# Patient Record
Sex: MALE | Race: WHITE | Employment: OTHER | URBAN - METROPOLITAN AREA
[De-identification: names, ages, dates, MRNs, and addresses within clinical notes are randomized per-mention and may not be internally consistent; named-entity substitution may affect disease eponyms.]

---

## 2017-03-24 ENCOUNTER — HOSPITAL ENCOUNTER (OUTPATIENT)
Dept: GENERAL RADIOLOGY | Age: 82
Discharge: HOME | End: 2017-03-24
Payer: MEDICARE

## 2017-03-24 DIAGNOSIS — M79.671 RIGHT FOOT PAIN: ICD-10-CM

## 2017-03-24 DIAGNOSIS — R73.9 BLOOD GLUCOSE ELEVATED: ICD-10-CM

## 2017-03-24 DIAGNOSIS — R63.4 LOSS OF WEIGHT: ICD-10-CM

## 2017-03-24 PROCEDURE — 73630 X-RAY EXAM OF FOOT: CPT

## 2017-04-17 ENCOUNTER — IMPORTED ENCOUNTER (OUTPATIENT)
Dept: URBAN - METROPOLITAN AREA CLINIC 1 | Facility: CLINIC | Age: 82
End: 2017-04-17

## 2017-04-17 PROBLEM — H04.123: Noted: 2017-04-17

## 2017-04-17 PROBLEM — Z96.1: Noted: 2017-04-17

## 2017-04-17 PROBLEM — H02.831: Noted: 2017-04-17

## 2017-04-17 PROBLEM — H43.813: Noted: 2017-04-17

## 2017-04-17 PROBLEM — H35.033: Noted: 2017-04-17

## 2017-04-17 PROBLEM — H02.834: Noted: 2017-04-17

## 2017-04-17 PROBLEM — H16.143: Noted: 2017-04-17

## 2017-04-17 PROBLEM — H01.005: Noted: 2017-04-17

## 2017-04-17 PROBLEM — H40.013: Noted: 2017-04-17

## 2017-04-17 PROBLEM — H01.002: Noted: 2017-04-17

## 2017-04-17 PROBLEM — H35.3134: Noted: 2017-04-17

## 2017-04-17 PROCEDURE — 92012 INTRM OPH EXAM EST PATIENT: CPT

## 2017-04-17 NOTE — PATIENT DISCUSSION
1.  ARMD OU advanced with subfoveal involvement/dry/stable. Importance of daily AREDS II study multivitamin and Amsler Grid checks discussed with patient. Patient to follow-up immediately with any new onset of decreased vision and/or metamorphopsia. 2. Glaucoma Suspect OU (0.8/0.75): Stabl IOP and cupping OU. Past w/u (-). Patient is considered Low Risk. 3.  SHILA w/ PEK OU- Stable. The increase of artificial tears OU to BID were recommended. 4.  Blepharitis posterior type OU- Continue daily warm compresses and lid scrubs were recommended. 5. Dermatochalasis OU UL's- Follow with no intervention at this time. 6. GR I Hypertensive Retinopathy OU- Stable continue HTN Control7. PVD OU- Old stable. RD precautions. 8.  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 9.  Return for an appointment for a 30 in 6 months with Dr. Deborah Vizcaino.

## 2017-10-06 ENCOUNTER — HOSPITAL ENCOUNTER (OUTPATIENT)
Dept: GENERAL RADIOLOGY | Age: 82
End: 2017-10-06
Attending: INTERNAL MEDICINE
Payer: MEDICARE

## 2017-10-06 ENCOUNTER — HOSPITAL ENCOUNTER (OUTPATIENT)
Dept: GENERAL RADIOLOGY | Age: 82
Discharge: HOME | End: 2017-10-06
Attending: INTERNAL MEDICINE
Payer: MEDICARE

## 2017-10-06 DIAGNOSIS — M26.609 JOINT DISEASE, TEMPOROMANDIBULAR: ICD-10-CM

## 2017-10-06 DIAGNOSIS — M26.69 TEMPOROMANDIBULAR JOINT SOUNDS ON OPENING AND/OR CLOSING THE JAW: ICD-10-CM

## 2017-10-06 PROCEDURE — 70330 X-RAY EXAM OF JAW JOINTS: CPT

## 2017-12-04 ENCOUNTER — IMPORTED ENCOUNTER (OUTPATIENT)
Dept: URBAN - METROPOLITAN AREA CLINIC 1 | Facility: CLINIC | Age: 82
End: 2017-12-04

## 2017-12-04 PROBLEM — H16.143: Noted: 2017-12-04

## 2017-12-04 PROBLEM — H02.834: Noted: 2017-12-04

## 2017-12-04 PROBLEM — H35.3134: Noted: 2017-12-04

## 2017-12-04 PROBLEM — H40.013: Noted: 2017-12-04

## 2017-12-04 PROBLEM — H01.001: Noted: 2017-12-04

## 2017-12-04 PROBLEM — H02.831: Noted: 2017-12-04

## 2017-12-04 PROBLEM — H04.123: Noted: 2017-12-04

## 2017-12-04 PROBLEM — H01.004: Noted: 2017-12-04

## 2017-12-04 PROCEDURE — 92014 COMPRE OPH EXAM EST PT 1/>: CPT

## 2017-12-04 NOTE — PATIENT DISCUSSION
1.  ARMD OU Advanced with subfoveal involvement/dry/stable. Importance of daily AREDS II study multivitamin and Amsler Grid checks discussed with patient. Patient to follow-up immediately with any new onset of decreased vision and/or metamorphopsia. 2. Glaucoma Suspect OU (0.8/0.75): Stable IOP and cupping OU. Past w/u (-). Patient is considered Low Risk. 3.  SHILA w/ PEK OU- Cont ATs BID OU Routinely. 4.  Anterior Blepharitis type OU- Continue daily warm compresses and lid scrubs were recommended. 5. Dermatochalasis OU UL's- Follow with no intervention at this time. 6. GR I Hypertensive Retinopathy OU- Stable continue HTN Control7. PVD OU- Old stable. RD precautions. 8.  Pseudophakia OU (Toric OU)- H/o YAG Cap OU. Letter to PCP Return for an appointment in 6 mo 10 dfe with Dr. Claudeen Cobble.

## 2018-02-17 ENCOUNTER — HOSPITAL ENCOUNTER (OUTPATIENT)
Dept: GENERAL RADIOLOGY | Age: 83
Discharge: HOME | End: 2018-02-17
Payer: MEDICARE

## 2018-02-17 DIAGNOSIS — R05.9 COUGH: ICD-10-CM

## 2018-02-17 PROCEDURE — 71046 X-RAY EXAM CHEST 2 VIEWS: CPT

## 2018-04-16 NOTE — PATIENT DISCUSSION
It was discussed with the patient the importance of good control of their blood sugar, blood pressure, cholesterol, diet, exercise and weight under the guidance of their diabetic doctor to prevent/halt diabetic retinopathy.

## 2018-06-12 ENCOUNTER — IMPORTED ENCOUNTER (OUTPATIENT)
Dept: URBAN - METROPOLITAN AREA CLINIC 1 | Facility: CLINIC | Age: 83
End: 2018-06-12

## 2018-06-12 PROBLEM — H01.004: Noted: 2018-06-12

## 2018-06-12 PROBLEM — H40.023: Noted: 2018-06-12

## 2018-06-12 PROBLEM — H35.3131: Noted: 2018-06-12

## 2018-06-12 PROBLEM — H01.005: Noted: 2018-06-12

## 2018-06-12 PROBLEM — H01.001: Noted: 2018-06-12

## 2018-06-12 PROBLEM — Z96.1: Noted: 2018-06-12

## 2018-06-12 PROBLEM — H16.143: Noted: 2018-06-12

## 2018-06-12 PROBLEM — H02.831: Noted: 2018-06-12

## 2018-06-12 PROBLEM — H01.002: Noted: 2018-06-12

## 2018-06-12 PROBLEM — H40.013: Noted: 2018-06-12

## 2018-06-12 PROBLEM — H43.813: Noted: 2018-06-12

## 2018-06-12 PROBLEM — H04.123: Noted: 2018-06-12

## 2018-06-12 PROBLEM — H02.834: Noted: 2018-06-12

## 2018-06-12 PROBLEM — H35.033: Noted: 2018-06-12

## 2018-06-12 PROCEDURE — 92012 INTRM OPH EXAM EST PATIENT: CPT

## 2018-07-02 ENCOUNTER — HOSPITAL ENCOUNTER (EMERGENCY)
Age: 83
Discharge: HOME | End: 2018-07-02
Attending: EMERGENCY MEDICINE | Admitting: EMERGENCY MEDICINE
Payer: MEDICARE

## 2018-07-02 VITALS
DIASTOLIC BLOOD PRESSURE: 77 MMHG | BODY MASS INDEX: 24.11 KG/M2 | RESPIRATION RATE: 16 BRPM | WEIGHT: 150 LBS | OXYGEN SATURATION: 97 % | TEMPERATURE: 97.8 F | SYSTOLIC BLOOD PRESSURE: 178 MMHG | HEART RATE: 64 BPM | HEIGHT: 66 IN

## 2018-07-02 DIAGNOSIS — N48.1 BALANITIS: Primary | ICD-10-CM

## 2018-07-02 LAB
ANION GAP SERPL CALC-SCNC: 7 MMOL/L (ref 3–18)
APPEARANCE UR: CLEAR
BASOPHILS # BLD: 0 K/UL (ref 0–0.06)
BASOPHILS NFR BLD: 0 % (ref 0–2)
BILIRUB UR QL: NEGATIVE
BUN SERPL-MCNC: 29 MG/DL (ref 7–18)
BUN/CREAT SERPL: 24 (ref 12–20)
CALCIUM SERPL-MCNC: 9.3 MG/DL (ref 8.5–10.1)
CHLORIDE SERPL-SCNC: 104 MMOL/L (ref 100–108)
CO2 SERPL-SCNC: 29 MMOL/L (ref 21–32)
COLOR UR: YELLOW
CREAT SERPL-MCNC: 1.22 MG/DL (ref 0.6–1.3)
DIFFERENTIAL METHOD BLD: ABNORMAL
EOSINOPHIL # BLD: 0.1 K/UL (ref 0–0.4)
EOSINOPHIL NFR BLD: 2 % (ref 0–5)
ERYTHROCYTE [DISTWIDTH] IN BLOOD BY AUTOMATED COUNT: 13.3 % (ref 11.6–14.5)
GLUCOSE SERPL-MCNC: 109 MG/DL (ref 74–99)
GLUCOSE UR STRIP.AUTO-MCNC: NEGATIVE MG/DL
HCT VFR BLD AUTO: 43.5 % (ref 36–48)
HGB BLD-MCNC: 15.5 G/DL (ref 13–16)
HGB UR QL STRIP: NEGATIVE
KETONES UR QL STRIP.AUTO: NEGATIVE MG/DL
LEUKOCYTE ESTERASE UR QL STRIP.AUTO: NEGATIVE
LYMPHOCYTES # BLD: 2.7 K/UL (ref 0.9–3.6)
LYMPHOCYTES NFR BLD: 38 % (ref 21–52)
MCH RBC QN AUTO: 33.3 PG (ref 24–34)
MCHC RBC AUTO-ENTMCNC: 35.6 G/DL (ref 31–37)
MCV RBC AUTO: 93.5 FL (ref 74–97)
MONOCYTES # BLD: 0.6 K/UL (ref 0.05–1.2)
MONOCYTES NFR BLD: 8 % (ref 3–10)
NEUTS SEG # BLD: 3.7 K/UL (ref 1.8–8)
NEUTS SEG NFR BLD: 52 % (ref 40–73)
NITRITE UR QL STRIP.AUTO: NEGATIVE
PH UR STRIP: 5.5 [PH] (ref 5–8)
PLATELET # BLD AUTO: 102 K/UL (ref 135–420)
PMV BLD AUTO: 11 FL (ref 9.2–11.8)
POTASSIUM SERPL-SCNC: 4.1 MMOL/L (ref 3.5–5.5)
PROT UR STRIP-MCNC: NEGATIVE MG/DL
RBC # BLD AUTO: 4.65 M/UL (ref 4.7–5.5)
SODIUM SERPL-SCNC: 140 MMOL/L (ref 136–145)
SP GR UR REFRACTOMETRY: 1.02 (ref 1–1.03)
UROBILINOGEN UR QL STRIP.AUTO: 0.2 EU/DL (ref 0.2–1)
WBC # BLD AUTO: 7.1 K/UL (ref 4.6–13.2)

## 2018-07-02 PROCEDURE — 80048 BASIC METABOLIC PNL TOTAL CA: CPT | Performed by: NURSE PRACTITIONER

## 2018-07-02 PROCEDURE — 81003 URINALYSIS AUTO W/O SCOPE: CPT | Performed by: NURSE PRACTITIONER

## 2018-07-02 PROCEDURE — 87086 URINE CULTURE/COLONY COUNT: CPT | Performed by: NURSE PRACTITIONER

## 2018-07-02 PROCEDURE — 99283 EMERGENCY DEPT VISIT LOW MDM: CPT

## 2018-07-02 PROCEDURE — 85025 COMPLETE CBC W/AUTO DIFF WBC: CPT | Performed by: NURSE PRACTITIONER

## 2018-07-02 RX ORDER — NYSTATIN 100000 U/G
OINTMENT TOPICAL
Qty: 15 G | Refills: 0 | Status: SHIPPED | OUTPATIENT
Start: 2018-07-02

## 2018-07-02 NOTE — ED PROVIDER NOTES
EMERGENCY DEPARTMENT HISTORY AND PHYSICAL EXAM    6:45 PM      Date: 7/2/2018  Patient Name: Jaja Mendez    History of Presenting Illness     Chief Complaint   Patient presents with    Penis Pain    Urinary Pain         History Provided By: Patient and Patient's Wife    Chief Complaint: Penis Pain  Duration:  2-3 weeks  Timing:  constant  Location: Penis  Quality: Burning  Severity: Moderate  Modifying Factors: Vaseline has relieved feeling of burning. Associated Symptoms: Erythema      Additional History (Context): Jaja Mendez is a 80 y.o. male with a PMHx of CAD and gout presents with constant, moderate penis pain with burning and associated erythema onset x 2-3 weeks ago. Pt reports he believed the erythema was normal and has found relief with Vaseline for a burning sensation. No pain at bedside. Pt states he sees NP regularly, about ever 2 months. Denies dysuria, DM. No further concerns or complaints at this time. PCP: Chrystal Oseguera MD    Current Outpatient Prescriptions   Medication Sig Dispense Refill    pantoprazole (PROTONIX) 40 mg tablet Take 1 Tab by mouth two (2) times a day. 60 Tab 1    amLODIPine (NORVASC) 5 mg tablet Take 5 mg by mouth daily.  METOPROLOL SUCCINATE PO Take 12.5 mg by mouth.  aspirin 81 mg tablet Take 81 mg by mouth.  lisinopril (PRINIVIL, ZESTRIL) 40 mg tablet Take 40 mg by mouth daily.  FOLIC ACID, BULK, by Does Not Apply route.  hydrochlorothiazide (HYDRODIURIL) 25 mg tablet Take 25 mg by mouth daily.  allopurinol (ZYLOPRIM) 100 mg tablet Take  by mouth daily.  RANITIDINE HCL (ZANTAC PO) Take  by mouth.            Past History     Past Medical History:  Past Medical History:   Diagnosis Date    CAD (coronary artery disease)     pt has stents    Gout        Past Surgical History:  Past Surgical History:   Procedure Laterality Date    CARDIAC SURG PROCEDURE UNLIST      stents       Family History:  No family history on file. Social History:  Social History   Substance Use Topics    Smoking status: Not on file    Smokeless tobacco: Not on file    Alcohol use Not on file       Allergies:  No Known Allergies      Review of Systems     Review of Systems   Constitutional: Negative for diaphoresis and fever. HENT: Negative for congestion and sore throat. Eyes: Negative for pain and itching. Respiratory: Negative for cough and shortness of breath. Cardiovascular: Negative for chest pain and palpitations. Gastrointestinal: Negative for abdominal pain and diarrhea. Endocrine: Negative for polydipsia and polyuria. Genitourinary: Positive for penile pain. Negative for dysuria and hematuria. Musculoskeletal: Negative for arthralgias and myalgias. Skin: Negative for rash and wound. Erythema of penis   Neurological: Negative for seizures and syncope. Hematological: Does not bruise/bleed easily. Psychiatric/Behavioral: Negative for agitation and hallucinations. Physical Exam     Visit Vitals    /77 (BP 1 Location: Right arm, BP Patient Position: At rest;Sitting)    Pulse 64    Temp 97.8 °F (36.6 °C)    Resp 16    Ht 5' 6\" (1.676 m)    Wt 68 kg (150 lb)    SpO2 97%    BMI 24.21 kg/m2       Physical Exam   Constitutional: He appears well-developed and well-nourished. HENT:   Head: Normocephalic and atraumatic. Eyes: Conjunctivae are normal. No scleral icterus. Neck: Normal range of motion. Neck supple. No JVD present. Cardiovascular: Normal rate, regular rhythm and normal heart sounds. 4 intact extremity pulses   Pulmonary/Chest: Effort normal and breath sounds normal.   Abdominal: Soft. He exhibits no mass. There is no tenderness. Genitourinary: Penile erythema (base of glands) present. Genitourinary Comments: No other lesions    Musculoskeletal: Normal range of motion. Lymphadenopathy:     He has no cervical adenopathy. Neurological: He is alert.    Skin: Skin is warm and dry. Nursing note and vitals reviewed. Diagnostic Study Results   Labs -  Recent Results (from the past 12 hour(s))   URINALYSIS W/ RFLX MICROSCOPIC    Collection Time: 07/02/18  6:15 PM   Result Value Ref Range    Color YELLOW      Appearance CLEAR      Specific gravity 1.018 1.005 - 1.030      pH (UA) 5.5 5.0 - 8.0      Protein NEGATIVE  NEG mg/dL    Glucose NEGATIVE  NEG mg/dL    Ketone NEGATIVE  NEG mg/dL    Bilirubin NEGATIVE  NEG      Blood NEGATIVE  NEG      Urobilinogen 0.2 0.2 - 1.0 EU/dL    Nitrites NEGATIVE  NEG      Leukocyte Esterase NEGATIVE  NEG     CBC WITH AUTOMATED DIFF    Collection Time: 07/02/18  6:20 PM   Result Value Ref Range    WBC 7.1 4.6 - 13.2 K/uL    RBC 4.65 (L) 4.70 - 5.50 M/uL    HGB 15.5 13.0 - 16.0 g/dL    HCT 43.5 36.0 - 48.0 %    MCV 93.5 74.0 - 97.0 FL    MCH 33.3 24.0 - 34.0 PG    MCHC 35.6 31.0 - 37.0 g/dL    RDW 13.3 11.6 - 14.5 %    PLATELET 212 (L) 971 - 420 K/uL    MPV 11.0 9.2 - 11.8 FL    NEUTROPHILS 52 40 - 73 %    LYMPHOCYTES 38 21 - 52 %    MONOCYTES 8 3 - 10 %    EOSINOPHILS 2 0 - 5 %    BASOPHILS 0 0 - 2 %    ABS. NEUTROPHILS 3.7 1.8 - 8.0 K/UL    ABS. LYMPHOCYTES 2.7 0.9 - 3.6 K/UL    ABS. MONOCYTES 0.6 0.05 - 1.2 K/UL    ABS. EOSINOPHILS 0.1 0.0 - 0.4 K/UL    ABS. BASOPHILS 0.0 0.0 - 0.06 K/UL    DF AUTOMATED         Radiologic Studies -   No orders to display     No results found. Medications ordered:   Medications - No data to display      Medical Decision Making   Initial Medical Decision Making and DDx:  Patient was consistent with balanitis and not consistent with herpetic infection or cellulitis. ED Course: Progress Notes, Reevaluation, and Consults:    I am the first provider for this patient. I reviewed the vital signs, available nursing notes, past medical history, past surgical history, family history and social history. Vital Signs-Reviewed the patient's vital signs.     Pulse Oximetry Analysis - 97% on room air, normal.    Records Reviewed: Nursing Notes and Old Medical Records (Time of Review: 6:45 PM)    Diagnosis     Clinical Impression:   1. Balanitis        Disposition: home    Follow-up Information     Follow up With Details Comments Contact Info    [facility] In 2 days  [address]  [phone]           Discharge Medication List as of 7/2/2018  6:48 PM      START taking these medications    Details   nystatin (MYCOSTATIN) 100,000 unit/gram ointment Apply to rash twice a day until rash resolves. , Print, Disp-15 g, R-0         CONTINUE these medications which have NOT CHANGED    Details   pantoprazole (PROTONIX) 40 mg tablet Take 1 Tab by mouth two (2) times a day., Print, Disp-60 Tab, R-1      amLODIPine (NORVASC) 5 mg tablet Take 5 mg by mouth daily. , Historical Med      METOPROLOL SUCCINATE PO Take 12.5 mg by mouth.  , Historical Med      aspirin 81 mg tablet Take 81 mg by mouth.  , Historical Med      lisinopril (PRINIVIL, ZESTRIL) 40 mg tablet Take 40 mg by mouth daily. , Historical Med      FOLIC ACID, BULK, by Does Not Apply route.  , Historical Med      hydrochlorothiazide (HYDRODIURIL) 25 mg tablet Take 25 mg by mouth daily. , Historical Med      allopurinol (ZYLOPRIM) 100 mg tablet Take  by mouth daily. , Historical Med      RANITIDINE HCL (ZANTAC PO) Take  by mouth.  , Historical Med           _______________________________    Attestations:  Scribe Attestation     Felecia Razo acting as a scribe for and in the presence of Paul Alvarez MD      July 02, 2018 at [facility]       Provider Attestation:      I personally performed the services described in the documentation, reviewed the documentation, as recorded by the scribe in my presence, and it accurately and completely records my words and actions.  July 02, 2018 at 6:45 PM - Paul Alvarez MD    _______________________________

## 2018-07-02 NOTE — DISCHARGE INSTRUCTIONS
Balanitis: Care Instructions  Your Care Instructions    Balanitis is irritation of the head of the penis. It is more common in men who have not been circumcised. The area under the foreskin that covers the head of the penis is often warm and moist. This can cause the growth of bacteria or a fungus. This can make the penis sore, red, swollen, and itchy. You may also feel burning when you urinate, have pus come from your penis, or have chills and a fever. Balanitis can also be caused by the chemicals in soap, condoms, or lubricants. Men with diabetes are more likely to get balanitis. Your doctor may suggest a cream that usually clears up the problem within 2 weeks. You can prevent balanitis by keeping your penis clean. You also can help prevent it by not using products that cause irritation. Follow-up care is a key part of your treatment and safety. Be sure to make and go to all appointments, and call your doctor if you are having problems. It's also a good idea to know your test results and keep a list of the medicines you take. How can you care for yourself at home? · Be safe with medicines. Take your medicine exactly as prescribed. If your doctor prescribed antibiotics, take them as directed. Do not stop taking them just because you feel better. You need to take the full course of antibiotics. · Keep your penis clean. If you have not been circumcised, gently pull the foreskin back to wash your penis with warm water. Make sure your penis is dry before you get dressed. · If latex condoms irritate your penis, try other condoms that are made for sensitive skin. Your doctor can help you make a good choice. · Wash your underwear with mild soap. Rinse it well. · If you work with harsh chemicals, wash your hands well before you go to the bathroom. When should you call for help?   Call your doctor now or seek immediate medical care if:  ? · You have signs of infection, such as:  ¨ Increased pain, swelling, warmth, or redness. ¨ Red streaks leading from the area. ¨ Pus draining from the area. ¨ A fever. ? Watch closely for changes in your health, and be sure to contact your doctor if:  ? · You do not get better as expected. Where can you learn more? Go to http://ge-nima.info/. Enter S742 in the search box to learn more about \"Balanitis: Care Instructions. \"  Current as of: March 14, 2017  Content Version: 11.4  © 1696-0372 [employer]. Care instructions adapted under license by [employer] (which disclaims liability or warranty for this information). If you have questions about a medical condition or this instruction, always ask your healthcare professional. [address] any warranty or liability for your use of this information.

## 2018-07-02 NOTE — ED NOTES
I performed a brief evaluation, including history and physical, of the patient here in triage and I have determined that pt will need further treatment and evaluation from the main side ER physician. I have placed initial orders to help in expediting patients care. July 02, 2018 at 5:37 PM - Pino Beavers NP        There were no vitals taken for this visit. Reported penis is hard and red, unable to hold urine.   Patient requesting a male provider, patient and family informed there will not be one until 18

## 2018-07-02 NOTE — ED TRIAGE NOTES
Patient states the pain started over 2 weeks ago, states his penis is red and hard, burning with urination

## 2018-07-04 LAB
BACTERIA SPEC CULT: ABNORMAL
SERVICE CMNT-IMP: ABNORMAL

## 2018-07-09 PROBLEM — A41.9 SEPSIS (HCC): Status: ACTIVE | Noted: 2017-10-08

## 2018-07-09 PROBLEM — R10.9 ABDOMINAL PAIN: Status: ACTIVE | Noted: 2017-10-08

## 2018-08-13 ENCOUNTER — HOSPITAL ENCOUNTER (OUTPATIENT)
Dept: GENERAL RADIOLOGY | Age: 83
Discharge: HOME | End: 2018-08-13
Payer: MEDICARE

## 2018-08-13 DIAGNOSIS — R52 PAIN: ICD-10-CM

## 2018-08-13 PROCEDURE — 73130 X-RAY EXAM OF HAND: CPT

## 2018-12-03 ENCOUNTER — IMPORTED ENCOUNTER (OUTPATIENT)
Dept: URBAN - METROPOLITAN AREA CLINIC 1 | Facility: CLINIC | Age: 83
End: 2018-12-03

## 2018-12-03 PROBLEM — H01.001: Noted: 2018-12-03

## 2018-12-03 PROBLEM — H40.013: Noted: 2018-12-03

## 2018-12-03 PROBLEM — H43.813: Noted: 2018-12-03

## 2018-12-03 PROBLEM — H02.831: Noted: 2018-12-03

## 2018-12-03 PROBLEM — H35.033: Noted: 2018-12-03

## 2018-12-03 PROBLEM — H16.143: Noted: 2018-12-03

## 2018-12-03 PROBLEM — H04.123: Noted: 2018-12-03

## 2018-12-03 PROBLEM — Z96.1: Noted: 2018-12-03

## 2018-12-03 PROBLEM — H02.834: Noted: 2018-12-03

## 2018-12-03 PROBLEM — H35.3134: Noted: 2018-12-03

## 2018-12-03 PROBLEM — H01.004: Noted: 2018-12-03

## 2018-12-03 PROCEDURE — 92014 COMPRE OPH EXAM EST PT 1/>: CPT

## 2018-12-03 PROCEDURE — 92133 CPTRZD OPH DX IMG PST SGM ON: CPT

## 2018-12-03 NOTE — PATIENT DISCUSSION
1.  ARMD OU Advanced with subfoveal involvement/dry/stable. Importance of daily AREDS II study multivitamin and Amsler Grid checks discussed with patient. Patient to follow-up immediately with any new onset of decreased vision and/or metamorphopsia. 2. Glaucoma Suspect OU (CD 0.80/0.75): OCT WNL OD minimal RNFL thinning OS. Patient is considered Low Risk. 3.  SHILA w/ PEK OU- Recommend ATs TID OU routinely. 4.  Pseudophakia OU - (Toric OU) H/o YAG Cap OU 5. Anterior Blepharitis OU - Daily Hot compresses and lid scrubs were recommended. 6. GR I Hypertensive Retinopathy OU- Stable continue HTN Control7. Dermatochalasis OU UL's  - Follow with no intervention at this time. 8. PVD OU - RD precautions. Return for an appointment in 6 months 10/DFE with Dr. Ho Her.

## 2018-12-03 NOTE — PATIENT DISCUSSION
Glaucoma Suspect OU (CD 0.80/0.75): OCT WNL OD minimal RNFL thinning OS. Patient is considered Low Risk.

## 2019-03-25 ENCOUNTER — HOSPITAL ENCOUNTER (OUTPATIENT)
Dept: VASCULAR SURGERY | Age: 84
Discharge: HOME | End: 2019-03-25
Attending: PODIATRIST
Payer: MEDICARE

## 2019-03-25 DIAGNOSIS — I73.9 INTERMITTENT CLAUDICATION (HCC): ICD-10-CM

## 2019-03-25 DIAGNOSIS — E11.42 DIABETIC POLYNEUROPATHY (HCC): ICD-10-CM

## 2019-03-25 LAB
LEFT ABI: 0.69
LEFT ANTERIOR TIBIAL: 101 MMHG
LEFT ARM BP: 167 MMHG
LEFT ATA BP LEVEL: NORMAL
LEFT CALF PRESSURE: 145 MMHG
LEFT LOW THIGH PRESSURE: 255 MMHG
LEFT POSTERIOR TIBIAL: 115 MMHG
LEFT TBI: 0.4
LEFT TOE PRESSURE: 67 MMHG
RIGHT ABI: 0.6
RIGHT ANTERIOR TIBIAL: 101 MMHG
RIGHT ARM BP: 165 MMHG
RIGHT ATA BP LEVEL: NORMAL
RIGHT CALF PRESSURE: 130 MMHG
RIGHT LOW THIGH PRESSURE: 255 MMHG
RIGHT POSTERIOR TIBIAL: 101 MMHG
RIGHT TBI: 0.37
RIGHT TOE PRESSURE: 62 MMHG

## 2019-03-25 PROCEDURE — 93923 UPR/LXTR ART STDY 3+ LVLS: CPT

## 2019-06-04 ENCOUNTER — IMPORTED ENCOUNTER (OUTPATIENT)
Dept: URBAN - METROPOLITAN AREA CLINIC 1 | Facility: CLINIC | Age: 84
End: 2019-06-04

## 2019-06-04 PROBLEM — H35.3134: Noted: 2019-06-04

## 2019-06-04 PROBLEM — H40.013: Noted: 2019-06-04

## 2019-06-04 PROCEDURE — 92012 INTRM OPH EXAM EST PATIENT: CPT

## 2019-06-04 NOTE — PATIENT DISCUSSION
1.  ARMD OU Advanced with subfoveal involvement/dry/stable. Importance of daily AREDS II study multivitamin and Amsler Grid checks discussed with patient. Patient to follow-up immediately with any new onset of decreased vision and/or metamorphopsia. 2. Glaucoma Suspect OU (CD 0.80/0.75) IOP stable on no gtts. Cont to observe off gtts. 3.  SHILA w/ PEK OU- Continue ATs TID OU routinely. 4.  Pseudophakia OU - (Toric OU) H/o YAG Cap OU 5. Anterior Blepharitis OU - Continue Daily Hot compresses and lid scrubs were recommended. 6. GR I Hypertensive Retinopathy OU- Stable continue HTN Control7. Dermatochalasis OU UL's  - observe. 8.  PVD OU - RD precautions. Return for an appointment in 6 mo 30 with Dr. Marilee Spears.

## 2019-11-19 ENCOUNTER — HOSPITAL ENCOUNTER (OUTPATIENT)
Dept: CT IMAGING | Age: 84
Discharge: HOME | End: 2019-11-19
Attending: INTERNAL MEDICINE
Payer: MEDICARE

## 2019-11-19 DIAGNOSIS — R22.1 NECK MASS: ICD-10-CM

## 2019-11-19 DIAGNOSIS — R05.9 COUGH: ICD-10-CM

## 2019-11-19 LAB — CREAT UR-MCNC: 1.3 MG/DL (ref 0.6–1.3)

## 2019-11-19 PROCEDURE — 74011636320 HC RX REV CODE- 636/320: Performed by: INTERNAL MEDICINE

## 2019-11-19 PROCEDURE — 70491 CT SOFT TISSUE NECK W/DYE: CPT

## 2019-11-19 PROCEDURE — 71260 CT THORAX DX C+: CPT

## 2019-11-19 PROCEDURE — 82565 ASSAY OF CREATININE: CPT

## 2019-11-19 RX ADMIN — IOPAMIDOL 70 ML: 612 INJECTION, SOLUTION INTRAVENOUS at 14:51

## 2019-12-09 ENCOUNTER — IMPORTED ENCOUNTER (OUTPATIENT)
Dept: URBAN - METROPOLITAN AREA CLINIC 1 | Facility: CLINIC | Age: 84
End: 2019-12-09

## 2019-12-09 PROBLEM — D23.111: Noted: 2019-12-09

## 2019-12-09 PROBLEM — H35.3133: Noted: 2019-12-09

## 2019-12-09 PROBLEM — H04.123: Noted: 2019-12-09

## 2019-12-09 PROBLEM — H16.143: Noted: 2019-12-09

## 2019-12-09 PROBLEM — H40.013: Noted: 2019-12-09

## 2019-12-09 PROBLEM — Z96.1: Noted: 2019-12-09

## 2019-12-09 PROBLEM — D23.112: Noted: 2019-12-09

## 2019-12-09 PROCEDURE — 92014 COMPRE OPH EXAM EST PT 1/>: CPT

## 2019-12-09 NOTE — PATIENT DISCUSSION
1.  ARMD OU Advanced with subfoveal involvement/dry/stable. Importance of daily AREDS II study multivitamin and Amsler Grid checks discussed with patient. Patient to follow-up immediately with any new onset of decreased vision and/or metamorphopsia. 2. Glaucoma Suspect OU (CD 0.80/0.75) IOP stable on no gtts. Cont to observe off gtts. 3.  SHILA w/ PEK OU- Continue ATs TID OU routinely. 4.  Pseudophakia OU - (Toric OU) H/o YAG Cap OU 5. Anterior Blepharitis OU - Continue Daily Hot compresses and lid scrubs were recommended. 6. GR I Hypertensive Retinopathy OU- Stable continue HTN Control7. Dermatochalasis OU UL's  - observe. 8.  PVD OU - RD precautions. 9. Papilloma RUL: apppears benign OD - appears benign. Continue to observe. Return for an appointment in 6 months for a 10/dfe with Dr. Fred Dior.

## 2020-01-06 ENCOUNTER — HOSPITAL ENCOUNTER (EMERGENCY)
Age: 85
Discharge: HOME | End: 2020-01-06
Attending: EMERGENCY MEDICINE | Admitting: EMERGENCY MEDICINE
Payer: MEDICARE

## 2020-01-06 VITALS
RESPIRATION RATE: 18 BRPM | TEMPERATURE: 98.7 F | HEIGHT: 66 IN | BODY MASS INDEX: 25.71 KG/M2 | SYSTOLIC BLOOD PRESSURE: 109 MMHG | DIASTOLIC BLOOD PRESSURE: 58 MMHG | HEART RATE: 80 BPM | OXYGEN SATURATION: 100 % | WEIGHT: 160 LBS

## 2020-01-06 DIAGNOSIS — R19.7 DIARRHEA, UNSPECIFIED TYPE: ICD-10-CM

## 2020-01-06 DIAGNOSIS — F32.9 CURRENT EPISODE OF MAJOR DEPRESSIVE DISORDER WITHOUT PRIOR EPISODE, UNSPECIFIED DEPRESSION EPISODE SEVERITY: Primary | ICD-10-CM

## 2020-01-06 LAB
AMPHET UR QL SCN: NEGATIVE
ANION GAP SERPL CALC-SCNC: 7 MMOL/L (ref 3–18)
APAP SERPL-MCNC: <2 UG/ML (ref 10–30)
APPEARANCE UR: CLEAR
BARBITURATES UR QL SCN: NEGATIVE
BASOPHILS # BLD: 0 K/UL (ref 0–0.1)
BASOPHILS NFR BLD: 0 % (ref 0–2)
BENZODIAZ UR QL: NEGATIVE
BILIRUB UR QL: NEGATIVE
BUN SERPL-MCNC: 21 MG/DL (ref 7–18)
BUN/CREAT SERPL: 18 (ref 12–20)
CALCIUM SERPL-MCNC: 9.2 MG/DL (ref 8.5–10.1)
CANNABINOIDS UR QL SCN: NEGATIVE
CHLORIDE SERPL-SCNC: 104 MMOL/L (ref 100–111)
CO2 SERPL-SCNC: 26 MMOL/L (ref 21–32)
COCAINE UR QL SCN: NEGATIVE
COLOR UR: NORMAL
CREAT SERPL-MCNC: 1.14 MG/DL (ref 0.6–1.3)
DIFFERENTIAL METHOD BLD: ABNORMAL
EOSINOPHIL # BLD: 0.1 K/UL (ref 0–0.4)
EOSINOPHIL NFR BLD: 1 % (ref 0–5)
ERYTHROCYTE [DISTWIDTH] IN BLOOD BY AUTOMATED COUNT: 14.5 % (ref 11.6–14.5)
GLUCOSE SERPL-MCNC: 133 MG/DL (ref 74–99)
GLUCOSE UR STRIP.AUTO-MCNC: NEGATIVE MG/DL
HCT VFR BLD AUTO: 38.4 % (ref 36–48)
HDSCOM,HDSCOM: NORMAL
HGB BLD-MCNC: 12.8 G/DL (ref 13–16)
HGB UR QL STRIP: NEGATIVE
KETONES UR QL STRIP.AUTO: NEGATIVE MG/DL
LEUKOCYTE ESTERASE UR QL STRIP.AUTO: NEGATIVE
LYMPHOCYTES # BLD: 1.1 K/UL (ref 0.9–3.6)
LYMPHOCYTES NFR BLD: 21 % (ref 21–52)
MAGNESIUM SERPL-MCNC: 1.8 MG/DL (ref 1.6–2.6)
MCH RBC QN AUTO: 33.1 PG (ref 24–34)
MCHC RBC AUTO-ENTMCNC: 33.3 G/DL (ref 31–37)
MCV RBC AUTO: 99.2 FL (ref 74–97)
METHADONE UR QL: NEGATIVE
MONOCYTES # BLD: 0.5 K/UL (ref 0.05–1.2)
MONOCYTES NFR BLD: 10 % (ref 3–10)
NEUTS SEG # BLD: 3.6 K/UL (ref 1.8–8)
NEUTS SEG NFR BLD: 68 % (ref 40–73)
NITRITE UR QL STRIP.AUTO: NEGATIVE
OPIATES UR QL: NEGATIVE
PCP UR QL: NEGATIVE
PH UR STRIP: 5 [PH] (ref 5–8)
PLATELET # BLD AUTO: 99 K/UL (ref 135–420)
PMV BLD AUTO: 11 FL (ref 9.2–11.8)
POTASSIUM SERPL-SCNC: 3.9 MMOL/L (ref 3.5–5.5)
PROT UR STRIP-MCNC: NEGATIVE MG/DL
RBC # BLD AUTO: 3.87 M/UL (ref 4.7–5.5)
SALICYLATES SERPL-MCNC: <1.7 MG/DL (ref 2.8–20)
SODIUM SERPL-SCNC: 137 MMOL/L (ref 136–145)
SP GR UR REFRACTOMETRY: 1.02 (ref 1–1.03)
UROBILINOGEN UR QL STRIP.AUTO: 0.2 EU/DL (ref 0.2–1)
WBC # BLD AUTO: 5.3 K/UL (ref 4.6–13.2)

## 2020-01-06 PROCEDURE — 85025 COMPLETE CBC W/AUTO DIFF WBC: CPT

## 2020-01-06 PROCEDURE — 74011250636 HC RX REV CODE- 250/636: Performed by: EMERGENCY MEDICINE

## 2020-01-06 PROCEDURE — 80307 DRUG TEST PRSMV CHEM ANLYZR: CPT

## 2020-01-06 PROCEDURE — 80048 BASIC METABOLIC PNL TOTAL CA: CPT

## 2020-01-06 PROCEDURE — 99284 EMERGENCY DEPT VISIT MOD MDM: CPT

## 2020-01-06 PROCEDURE — 83735 ASSAY OF MAGNESIUM: CPT

## 2020-01-06 PROCEDURE — 81003 URINALYSIS AUTO W/O SCOPE: CPT

## 2020-01-06 RX ADMIN — SODIUM CHLORIDE 500 ML: 900 INJECTION, SOLUTION INTRAVENOUS at 13:08

## 2020-01-06 NOTE — CONSULTS
INSIGHT TELEPSYCHIATRY  Name: Howell Lesch   : 1923  Date: 2020   Time: 3:24 pm  [facility] of [facility]  [facility] of doctor: [facility]  This evaluation was conducted via telepsychiatry with the assistance of onsite staff    Chief Complaint: suicidal ideation  History of Present Illness: The patient is a 80-year-old male who presented to the emergency department with his neighbors who stated that he has been expressing suicidal ideation for the last three days. They stated that this was very unusual for him to make such statements. The neighbors are a  couple and they look out for him. They stated that this was not an offhand comment but that rather he has made statements about killing himself each of the last three days. There is also been concern about him driving    Patient said that he said the wrong thing in saying he had suicidal thoughts. His neighbor said that he called and threatened to kill himself. He could not remember if he said something about wanting to kill himself. His neighbor said that he said that he just wanted to die, just wanted to die.   He then later said that he remembered wanting to die, then said he was just making conversation or just making a joke.   Patient was very adamant that he is safe. He said that he did 46 years in the [facility] [employer]. His other neighbor said that he would kill himself if he knew how. He said that he has not been feeling sad recently. He seems to be denying any mention of symptoms.     Review of Factors in this patient that may increase a persons risk for suicide:  - Current ideation, intent, plan, access to means   x__Not Present  __Present, description:  - Previous suicide attempt or attempts  _x_Not Present  __Present, description:  - Alcohol / Substance abuse  x__Not Present  __Present, description:  - Current or previous history of psychiatric diagnosis  x__Not Present  __Present, description:  - Impulsivity and poor self control  _x_Not Present  __Present, description:  - Hopelessness  presence, duration, severity  _x_Not Present  __Present, description:  - Recent losses  physical, financial, personal  _x_Not Present  __Present, description:  - Recent discharge from an inpatient psychiatric unit  _x_Not Present  __Present, description:  - Family history of suicide  _x_Not Present  __Present, description:  - History of abuse (physical, sexual or emotional)  _x_Not Present  __Present, description:  - Co-morbid health problems, especially a newly diagnosed problem or worsening symptoms  _x_Not Present  _x_Present, description:   - Age, gender, race (elderly or young adult, unmarried, white, male, living alone)  __Not Present  __Present, description: elderly male living alone  - Same- sex sexual orientation  x__Not Present  __Present, description:    Review of Factors in this patient that may decrease the risk for suicide:    - Positive social support  _x_ Present  __ Not Present, description:  - Spirituality  _x_ Present  __ Not Present, description:  - Sense of responsibility to family  _x_ Present  __ Not Present, description:  - Children in the home, pregnancy  __ Present  __x Not Present, description:  - Life satisfaction  x__ Present  __ Not Present, description:  - Reality testing ability  _x_ Present  __ Not Present, description:  - Positive coping skills  _x_ Present  __ Not Present, description:  - Positive problem-solving skills  __ Present  _x_ Not Present, description:  - Positive therapeutic relationship  __ Present  _x_ Not Present, description:          PSYCHIATRIC REVIEW OF SYSTEMS (currently or in recent past): The patient did not seem to be a reliable historian. He seemed to deny any symptom out of fear of being hospitalized. Therefore the answers to the items below are of marginal value as he did not seem to consider them meaningfully. Head trauma/LOC:  denies. Psychotic reactions:   Perceptual Disturbances- denies    Delusions- denies    Disorganization of speech- denies    Disorganization of behavior- denies    Negative symptoms- denies   Cognitive deficits: denies   Anxiety:    Panic attacks- denies    Obsessions or compulsions- denies    Recurrent traumatic memories- denies   Depression: The neighbors say that he has been depressed. The patient did not seem to be a reliable historian seeming to denied any symptom because he was wanting to leave the hospital.  Mary: denies   Trauma: Adult- denies    Child - denies   Eating Disorders: denies   Personality Traits suggested through interview (with recognition that Axis II diagnoses are difficult to detect and assign in a relatively brief interview):   Cluster A-not evident    Cluster B- not evident    Cluster C- not evident. Collateral: Chart review   SI/ Self harm: denies/ denies   HI/Violence: denies/ denies   Trauma history:  Denies   Access to weapons: denies   Legal: denies   Psychiatric History/Treatment History: Denies past psychiatric hospitalizations. Denies past suicide attempts. Current outpatient treatment: denies   Drug/Alcohol History: Denies        Medical History:    CAD (coronary artery disease)       pt has stents    Gout           Medications & Freq: no medications listed        Allergies: sulfa   Sleep: Quantity:  I sleep fine! Quality: unclear  Family Psych History/History of suicide: denies, denies     Social History: He lives with a housemate who is gone for weeks at a time. The housemate is in his 49s. He later said that the person did not live with her. Employment: He said he was in the [employer] [facility] [employer] for many years. Stressors: lonely   Strengths/supports: neighbors who care about him. Mental Status Exam: The patient was moderately agitated during the interview. He did not seem to meaningfully answer questions as he was replying no to any type of symptoms.   Abnormal Movements: He was making movements at times to take off his hospital gown or pulse oximeter in an effort to leave the room. He seemed agitated and quite upset. General Appearance: Adequate grooming and hygiene. No apparent physical distress. Speech: Very hard of hearing, able to speak but not always in clear response to what was said to him. His words were understandable. Mood- Agitated, frightened  , Affect:  Frightened, agitated, labile in range and appropriate to situation and content. Thought processes:  Patient is focused on repeatedly saying how independent and capable he is. Suicidal ideation,plan or intent: denies  Homicidal ideation, plan or intent: denies  Visual hallucinations: denies  Auditory hallucinations: denies  Claimant does not appear to be responding to any internal stimuli. Delusions: Denies; none evident on exam  Insight: Fair to poor  Judgment: Fair to poor  Patient was unable to complete cognitive screening as he was quite agitated. Impression/Risk Assessment: The patient is a 77-year-old male who was brought to the hospital by neighbors who state that he has been making suicidal statements for the last three days. They emphasize that this has not been an offhand comment but that he has repeatedly made the statements. The patient now denies any suicidal ideation and seems to minimize his statements. He does not seem to remember at times what he said and contradicts himself frequently during our discussion. There is also concern about his ability to drive and care for himself. In my clinical opinion, the patient is at risk for harm to self and therefore needs inpatient care. Diagnosis: Adjustment disorder with depressed mood versus depressive disorder unspecified; neurocognitive disorder unspecified  Treatment Recommendations:   1. Inpatient psychiatric care.  The patient needs neurocognitive workup in addition to provision of a safe environment in the context of suicidal statements over three consecutive days. 2. Defer psychiatric medication choices to the inpatient psychiatric team.   Level of Care: Inpatient psychiatric care. This is an unfortunate situation as the patient clearly values his independence and views this hospitalization as that being taken away. I tried to explain to him that the focus is on his immediate safety and needing to make sure that he is safe. Given that he has made suicidal statements on three consecutive days, it is not prudent to permit him to go home at this time. Hopefully safety will be able to be established in a relatively brief psychiatric hospitalization with outpatient resources set up for him to either maintain his current independent living environment or place him in a facility that will better meet his needs.       Involuntary Admission for Treatment  The patient is unwilling to volunteer or incapable of volunteering for hospitalization or treatment, has a mental illness and is in need of hospitalization or treatment, and there exists a substantial likelihood that, as a result of mental illness, the respondent will, in the near future:  cause serious physical harm to [x ] self [ ] others as evidenced by recent behavior causing, attempting, or threatening harm and other relevant information, if any,     or [ x] suffer serious harm due to respondents lack of capacity to protect self from harm or to provide for respondents own basic human needs  The patient meets criteria for Involuntary Admission for Treatment

## 2020-01-06 NOTE — ED NOTES
Pt has dressed and attempted to leave hospital Rm. Is unaware that he is going to be placed on a TDO. Notified MD to speak with the pt.

## 2020-01-06 NOTE — ED NOTES
Assumed care of pt. Pt is laying on stretcher and in NAD. A&OX4. Breathing is spontaneous and unlabored. Skin is warm and dry. C/o diarrhea for weeks according to pt that he has been prescribed Lomentil for. States he had approx. 3 episodes this AM. Denies bloody stools. Denies NV, dizziness, CP. Per pt, he says he feels like he wants to go to sleep and not wake up and that when you het to be this age That's a regular things. Denies SI at this time. Denies having a plan. Friends state he called them this AM and said they needed to take him to the ER immediately and if they didn't come he would kill himself. Additionally they state he has been talking about dying for a couple of weeks. Pt on partial monitor. VVS.    Friends at bedside.

## 2020-01-06 NOTE — DISCHARGE INSTRUCTIONS
RETURN IMMEDIATELY IF YOU FEEL YOU WANT TO HURT OR KILL YOURSELF. WE ARE HERE TO HELP YOU. Thank you so much for visiting us today. Please make sure to call your doctor today to be rechecked in 1-3 days. Bring your results from here with you when you do. Return immediately if any fevers, headaches, chest pain, difficulty breathing, abdominal pain, worsening or changing symptoms, or any other concerns. Patient Education        Recovering From Depression: Care Instructions  Your Care Instructions    Taking good care of yourself is important as you recover from depression. In time, your symptoms will fade as your treatment takes hold. Do not give up. Instead, focus your energy on getting better. Your mood will improve. It just takes some time. Focus on things that can help you feel better, such as being with friends and family, eating well, and getting enough rest. But take things slowly. Do not do too much too soon. You will begin to feel better gradually. Follow-up care is a key part of your treatment and safety. Be sure to make and go to all appointments, and call your doctor if you are having problems. It's also a good idea to know your test results and keep a list of the medicines you take. How can you care for yourself at home? Be realistic  · If you have a large task to do, break it up into smaller steps you can handle, and just do what you can. · You may want to put off important decisions until your depression has lifted. If you have plans that will have a major impact on your life, such as marriage, divorce, or a job change, try to wait a bit. Talk it over with friends and loved ones who can help you look at the overall picture first.  · Reaching out to people for help is important. Do not isolate yourself. Let your family and friends help you. Find someone you can trust and confide in, and talk to that person. · Be patient, and be kind to yourself.  Remember that depression is not your fault and is not something you can overcome with willpower alone. Treatment is necessary for depression, just like for any other illness. Feeling better takes time, and your mood will improve little by little. Stay active  · Stay busy and get outside. Take a walk, or try some other light exercise. · Talk with your doctor about an exercise program. Exercise can help with mild depression. · Go to a movie or concert. Take part in a Yazdanism activity or other social gathering. Go to a ball game. · Ask a friend to have dinner with you. Take care of yourself  · Eat a balanced diet with plenty of fresh fruits and vegetables, whole grains, and lean protein. If you have lost your appetite, eat small snacks rather than large meals. · Avoid drinking alcohol or using illegal drugs. Do not take medicines that have not been prescribed for you. They may interfere with medicines you may be taking for depression, or they may make your depression worse. · Take your medicines exactly as they are prescribed. You may start to feel better within 1 to 3 weeks of taking antidepressant medicine. But it can take as many as 6 to 8 weeks to see more improvement. If you have questions or concerns about your medicines, or if you do not notice any improvement by 3 weeks, talk to your doctor. · If you have any side effects from your medicine, tell your doctor. Antidepressants can make you feel tired, dizzy, or nervous. Some people have dry mouth, constipation, headaches, sexual problems, or diarrhea. Many of these side effects are mild and will go away on their own after you have been taking the medicine for a few weeks. Some may last longer. Talk to your doctor if side effects are bothering you too much. You might be able to try a different medicine. · Get enough sleep. If you have problems sleeping:  ? Go to bed at the same time every night, and get up at the same time every morning. ? Keep your bedroom dark and quiet.   ? Do not exercise after 5: 00 p.m.  ? Avoid drinks with caffeine after 5:00 p.m. · Avoid sleeping pills unless they are prescribed by the doctor treating your depression. Sleeping pills may make you groggy during the day, and they may interact with other medicine you are taking. · If you have any other illnesses, such as diabetes, heart disease, or high blood pressure, make sure to continue with your treatment. Tell your doctor about all of the medicines you take, including those with or without a prescription. · Keep the numbers for these national suicide hotlines: 2-757-071-TALK (8-209.731.1309) and 9-420-ALLRQOZ (0-885.343.6477). If you or someone you know talks about suicide or feeling hopeless, get help right away. When should you call for help? Call 911 anytime you think you may need emergency care. For example, call if:    · You feel like hurting yourself or someone else.     · Someone you know has depression and is about to attempt or is attempting suicide.   [facility] your doctor now or seek immediate medical care if:    · You hear voices.     · Someone you know has depression and:  ? Starts to give away his or her possessions. ? Uses illegal drugs or drinks alcohol heavily. ? Talks or writes about death, including writing suicide notes or talking about guns, knives, or pills. ? Starts to spend a lot of time alone. ? Acts very aggressively or suddenly appears calm.    Watch closely for changes in your health, and be sure to contact your doctor if:    · You do not get better as expected. Where can you learn more? Go to http://ge-nima.info/. Enter G547 in the search box to learn more about \"Recovering From Depression: Care Instructions. \"  Current as of: May 28, 2019  Content Version: 12.2  © 5768-8082 [employer]. Care instructions adapted under license by [employer] (which disclaims liability or warranty for this information).  If you have questions about a medical condition or this instruction, always ask your healthcare professional. [place] [address] any warranty or liability for your use of this information. Patient Education        Diarrhea: Care Instructions  Your Care Instructions    Diarrhea is loose, watery stools (bowel movements). The exact cause is often hard to find. Sometimes diarrhea is your body's way of getting rid of what caused an upset stomach. Viruses, food poisoning, and many medicines can cause diarrhea. Some people get diarrhea in response to emotional stress, anxiety, or certain foods. Almost everyone has diarrhea now and then. It usually isn't serious, and your stools will return to normal soon. The important thing to do is replace the fluids you have lost, so you can prevent dehydration. The doctor has checked you carefully, but problems can develop later. If you notice any problems or new symptoms, get medical treatment right away. Follow-up care is a key part of your treatment and safety. Be sure to make and go to all appointments, and call your doctor if you are having problems. It's also a good idea to know your test results and keep a list of the medicines you take. How can you care for yourself at home? · Watch for signs of dehydration, which means your body has lost too much water. Dehydration is a serious condition and should be treated right away. Signs of dehydration are:  ? Increasing thirst and dry eyes and mouth. ? Feeling faint or lightheaded. ? A smaller amount of urine than normal.  · To prevent dehydration, drink plenty of fluids. Choose water and other caffeine-free clear liquids until you feel better. If you have kidney, heart, or liver disease and have to limit fluids, talk with your doctor before you increase the amount of fluids you drink. · Begin eating small amounts of mild foods the next day, if you feel like it. ? Try yogurt that has live cultures of Lactobacillus. (Check the label.)  ?  Avoid spicy foods, fruits, alcohol, and caffeine until 48 hours after all symptoms are gone. ? Avoid chewing gum that contains sorbitol. ? Avoid dairy products (except for yogurt with Lactobacillus) while you have diarrhea and for 3 days after symptoms are gone. · The doctor may recommend that you take over-the-counter medicine, such as loperamide (Imodium), if you still have diarrhea after 6 hours. Read and follow all instructions on the label. Do not use this medicine if you have bloody diarrhea, a high fever, or other signs of serious illness. Call your doctor if you think you are having a problem with your medicine. When should you call for help? Call 911 anytime you think you may need emergency care. For example, call if:    · You passed out (lost consciousness).     · Your stools are maroon or very bloody.    Call your doctor now or seek immediate medical care if:    · You are dizzy or lightheaded, or you feel like you may faint.     · Your stools are black and look like tar, or they have streaks of blood.     · You have new or worse belly pain.     · You have symptoms of dehydration, such as:  ? Dry eyes and a dry mouth. ? Passing only a little dark urine. ? Feeling thirstier than usual.     · You have a new or higher fever.    Watch closely for changes in your health, and be sure to contact your doctor if:    · Your diarrhea is getting worse.     · You see pus in the diarrhea.     · You are not getting better after 2 days (48 hours). Where can you learn more? Go to http://ge-nima.info/. Enter X983 in the search box to learn more about \"Diarrhea: Care Instructions. \"  Current as of: June 26, 2019  Content Version: 12.2  © 6711-3320 [employer]. Care instructions adapted under license by [employer] (which disclaims liability or warranty for this information).  If you have questions about a medical condition or this instruction, always ask your healthcare professional. [address] any warranty or liability for your use of this information.

## 2020-01-06 NOTE — ED TRIAGE NOTES
Pt arrives with friends with c/o diarrhea and dysuria for months. Per friends pt stated he wants to kill himself. Pt states that no SI but does need help at home.

## 2020-01-06 NOTE — PROGRESS NOTES
Called [facility] and left a message to return call. Spoke with Dr Stan Hines and requested [address] and states that he will arrange for pt. Pt updated ad agreed.  Discussed plan with ED MD.
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
## 2020-01-06 NOTE — ED NOTES
Patient with telepsychiatry, patient denies SI, states \"I must have said the wrong thing\". Patient has pulled off all leads for cardiac monitor, patient placed in his sweatshirt for comfort.  Patient remains in the bed, IV is still in place

## 2020-01-06 NOTE — ED PROVIDER NOTES
[address] [facility]  EMERGENCY DEPARTMENT HISTORY AND PHYSICAL EXAM       Date: 1/6/2020   Patient Name: Cortes Marie   YOB: 1923  Medical Record Number: [id number]    HISTORY OF PRESENTING ILLNESS:     Cortes Marie is a 80 y.o. male presenting with the noted PMH to the ED c/o suicidal ideation. According to patient's neighbors they stated that he called him first thing this morning said he went to kill himself. They state that was very unusual for him. He also noted a couple days ago he did make a comment that if he knew how to kill himself he would. They state that he lives at home alone. He does all of his ADLs. Patient states he mows the lawn. Patient states he has had diarrhea for the last month. About 5 times a day. Has been taking Lomotil. Last dose this morning. No relief. Denies any abdominal pain. Denies any fevers or chills. Denies any nausea or vomiting. Denies any chest pain or shortness of breath. 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 He states is unchanged. Denies any urinary changes. He states he does have problems with urinary incontinence and takes medications for it. He states he is just sick of life. He cannot do it anymore. Neighbors are concerned because he still drives. And concerned that he does sundown in the evenings. He is coming in asking for help. Rest of 10 systems reviewed and negative.     Primary Care Provider: Savannah Willoughby MD   Specialist:    Past Medical History:   Past Medical History:   Diagnosis Date    CAD (coronary artery disease)     pt has stents    Gout         Past Surgical History:   Past Surgical History:   Procedure Laterality Date    CARDIAC SURG PROCEDURE UNLIST      stents        Social History:   Social History     Tobacco Use    Smoking status: Former Smoker    Smokeless tobacco: Never Used   Substance Use Topics    Alcohol use: Not Currently    Drug use: Never Allergies: Allergies   Allergen Reactions    Sulfa (Sulfonamide Antibiotics) Other (comments)     nkda        REVIEW OF SYSTEMS:  Review of Systems      PHYSICAL EXAM:  Vitals:    01/06/20 1530 01/06/20 1555 01/06/20 1827 01/06/20 1838   BP: 175/83  109/58 109/58   Pulse:    80   Resp:    18   Temp:    98.7 °F (37.1 °C)   SpO2: 97% 100%  100%   Weight:       Height:           Physical Exam   Vital signs reviewed. Alert in NAD. Elderly and frail. HEENT: normocephalic atraumatic. Eyes are PERRLA EOMI. Conjunctiva normal.    External ears and nose normal.    Neck: normal external exam. No midline neck or back TTP. Lungs are clear to ascultation bilaterally. normal effort  Heart is regular rate and rhythm with no murmurs. Abdomen soft and nontender. No rebound rigidity or guarding. Extremities: Moves all 4 extremities and no distress. Full range of motion. 2+ pulses and BCR in all 4 extremities. Neuro: Normal gait. 5 out of 5 strength in all 4 extremities. No facial droop. Skin examination: intact. no rashes. No petechia or purpura.       Medications   sodium chloride 0.9 % bolus infusion 500 mL (0 mL IntraVENous IV Completed 1/6/20 1400)       RESULTS:    Labs -   Labs Reviewed   CBC WITH AUTOMATED DIFF - Abnormal; Notable for the following components:       Result Value    RBC 3.87 (*)     HGB 12.8 (*)     MCV 99.2 (*)     PLATELET 99 (*)     All other components within normal limits   METABOLIC PANEL, BASIC - Abnormal; Notable for the following components:    Glucose 133 (*)     BUN 21 (*)     GFR est non-AA 60 (*)     All other components within normal limits   ACETAMINOPHEN - Abnormal; Notable for the following components:    Acetaminophen level <2 (*)     All other components within normal limits   SALICYLATE - Abnormal; Notable for the following components:    Salicylate level <4.6 (*)     All other components within normal limits   URINALYSIS W/ RFLX MICROSCOPIC   DRUG SCREEN, URINE MAGNESIUM       Radiologic Studies -  No results found. MEDICAL DECISION MAKING      1400 Paging tele psych. 0 Telepsych has called back. Will see patient. 1750 Pt reassessed. [facility] at bedside. [address] with Nimisha Lane. States patient can be discharged home. Can not make him transfer involuntarily. Understands risk. 18 Spoke with patient and friends at bedside. results and precautions explained. They will watch over him. Per policy here in [place] [facility] makes decision about involuntary versus voluntary. At this point Nimisha Lane has states that the patient cannot be made involuntary for transfer. Therefore per policy patient will be discharged home. Spoke with friends regarding monitoring patient as concerns with his stating suicidality. They state understanding. Home health and case management have been involved to help him at home as well. Patient states he was just looking for more assistance in his house. Case management has reached out to his PCP as well to help with him at home. Results and precautions explained. Patient denies any active suicidal ideation. Patient and friends agree with plan. Patient states he is ready to go home. Patient has no new complaints, changes, or physical findings. Results were reviewed with the patient. Pt's questions and concerns were addressed. Care plan was outlined, including follow-up with PCP/specialist and return precautions were discussed. Patient is felt to be stable for discharge at this time. Diagnosis   Clinical Impression:   1. Current episode of major depressive disorder without prior episode, unspecified depression episode severity    2. Diarrhea, unspecified type           discharged in stable and improved condition. This chart was completed using Dragon, a dictation transcription service. Errors may have resulted from using this device.

## 2020-01-07 ENCOUNTER — HOME HEALTH ADMISSION (OUTPATIENT)
Dept: HOME HEALTH SERVICES | Facility: HOME HEALTH | Age: 85
End: 2020-01-07

## 2020-01-07 NOTE — ED NOTES
I have reviewed discharge instructions with the patient and nephew. The patient and nephew verbalized understanding. Pt was A&OX4. Ambulatory with a cane and left with his nephew. All questions answered. Follow-up care discussed, encouraged to return if problems progress.      [facility].

## 2020-01-07 NOTE — PROGRESS NOTES
1/7/20  1620 Spoke with Mu Shelley from Dr Devan Ken office and states that [address] [place] [address] [facility].

## 2020-01-08 ENCOUNTER — HOME CARE VISIT (OUTPATIENT)
Dept: HOME HEALTH SERVICES | Facility: HOME HEALTH | Age: 85
End: 2020-01-08

## 2020-01-08 ENCOUNTER — HOME CARE VISIT (OUTPATIENT)
Dept: SCHEDULING | Facility: HOME HEALTH | Age: 85
End: 2020-01-08

## 2020-01-09 ENCOUNTER — HOME HEALTH ADMISSION (OUTPATIENT)
Dept: HOME HEALTH SERVICES | Facility: HOME HEALTH | Age: 85
End: 2020-01-09
Payer: MEDICARE

## 2020-01-11 ENCOUNTER — HOME CARE VISIT (OUTPATIENT)
Dept: HOME HEALTH SERVICES | Facility: HOME HEALTH | Age: 85
End: 2020-01-11
Payer: MEDICARE

## 2020-01-11 ENCOUNTER — HOME CARE VISIT (OUTPATIENT)
Dept: SCHEDULING | Facility: HOME HEALTH | Age: 85
End: 2020-01-11
Payer: MEDICARE

## 2020-01-11 PROCEDURE — 3331090002 HH PPS REVENUE DEBIT

## 2020-01-11 PROCEDURE — 400013 HH SOC

## 2020-01-11 PROCEDURE — 3331090001 HH PPS REVENUE CREDIT

## 2020-01-11 PROCEDURE — G0299 HHS/HOSPICE OF RN EA 15 MIN: HCPCS

## 2020-01-12 PROCEDURE — 3331090001 HH PPS REVENUE CREDIT

## 2020-01-12 PROCEDURE — 3331090002 HH PPS REVENUE DEBIT

## 2020-01-13 VITALS
SYSTOLIC BLOOD PRESSURE: 120 MMHG | TEMPERATURE: 98.2 F | RESPIRATION RATE: 18 BRPM | DIASTOLIC BLOOD PRESSURE: 60 MMHG | OXYGEN SATURATION: 97 % | HEART RATE: 102 BPM

## 2020-01-13 PROCEDURE — 3331090002 HH PPS REVENUE DEBIT

## 2020-01-13 PROCEDURE — 3331090001 HH PPS REVENUE CREDIT

## 2020-01-14 ENCOUNTER — HOME CARE VISIT (OUTPATIENT)
Dept: HOME HEALTH SERVICES | Facility: HOME HEALTH | Age: 85
End: 2020-01-14
Payer: MEDICARE

## 2020-01-14 ENCOUNTER — HOME CARE VISIT (OUTPATIENT)
Dept: SCHEDULING | Facility: HOME HEALTH | Age: 85
End: 2020-01-14
Payer: MEDICARE

## 2020-01-14 VITALS
SYSTOLIC BLOOD PRESSURE: 126 MMHG | TEMPERATURE: 98.6 F | OXYGEN SATURATION: 97 % | HEART RATE: 57 BPM | DIASTOLIC BLOOD PRESSURE: 84 MMHG | RESPIRATION RATE: 16 BRPM

## 2020-01-14 PROCEDURE — G0299 HHS/HOSPICE OF RN EA 15 MIN: HCPCS

## 2020-01-14 PROCEDURE — 3331090001 HH PPS REVENUE CREDIT

## 2020-01-14 PROCEDURE — 3331090002 HH PPS REVENUE DEBIT

## 2020-01-15 PROCEDURE — 3331090002 HH PPS REVENUE DEBIT

## 2020-01-15 PROCEDURE — 3331090001 HH PPS REVENUE CREDIT

## 2020-01-16 ENCOUNTER — HOME CARE VISIT (OUTPATIENT)
Dept: SCHEDULING | Facility: HOME HEALTH | Age: 85
End: 2020-01-16
Payer: MEDICARE

## 2020-01-16 PROCEDURE — 3331090002 HH PPS REVENUE DEBIT

## 2020-01-16 PROCEDURE — G0299 HHS/HOSPICE OF RN EA 15 MIN: HCPCS

## 2020-01-16 PROCEDURE — 3331090001 HH PPS REVENUE CREDIT

## 2020-01-17 ENCOUNTER — HOME CARE VISIT (OUTPATIENT)
Dept: SCHEDULING | Facility: HOME HEALTH | Age: 85
End: 2020-01-17
Payer: MEDICARE

## 2020-01-17 VITALS
TEMPERATURE: 98.2 F | DIASTOLIC BLOOD PRESSURE: 54 MMHG | SYSTOLIC BLOOD PRESSURE: 114 MMHG | HEART RATE: 78 BPM | OXYGEN SATURATION: 98 %

## 2020-01-17 PROCEDURE — 3331090002 HH PPS REVENUE DEBIT

## 2020-01-17 PROCEDURE — 3331090001 HH PPS REVENUE CREDIT

## 2020-01-17 PROCEDURE — G0151 HHCP-SERV OF PT,EA 15 MIN: HCPCS

## 2020-01-18 VITALS
TEMPERATURE: 98.4 F | SYSTOLIC BLOOD PRESSURE: 118 MMHG | OXYGEN SATURATION: 98 % | RESPIRATION RATE: 16 BRPM | DIASTOLIC BLOOD PRESSURE: 60 MMHG | HEART RATE: 75 BPM

## 2020-01-18 PROCEDURE — 3331090001 HH PPS REVENUE CREDIT

## 2020-01-18 PROCEDURE — 3331090002 HH PPS REVENUE DEBIT

## 2020-01-19 PROCEDURE — 3331090002 HH PPS REVENUE DEBIT

## 2020-01-19 PROCEDURE — 3331090001 HH PPS REVENUE CREDIT

## 2020-01-20 ENCOUNTER — HOME CARE VISIT (OUTPATIENT)
Dept: HOME HEALTH SERVICES | Facility: HOME HEALTH | Age: 85
End: 2020-01-20
Payer: MEDICARE

## 2020-01-20 PROCEDURE — 3331090001 HH PPS REVENUE CREDIT

## 2020-01-20 PROCEDURE — 3331090002 HH PPS REVENUE DEBIT

## 2020-01-21 ENCOUNTER — HOME CARE VISIT (OUTPATIENT)
Dept: SCHEDULING | Facility: HOME HEALTH | Age: 85
End: 2020-01-21
Payer: MEDICARE

## 2020-01-21 VITALS
SYSTOLIC BLOOD PRESSURE: 130 MMHG | HEART RATE: 91 BPM | DIASTOLIC BLOOD PRESSURE: 68 MMHG | OXYGEN SATURATION: 95 % | TEMPERATURE: 98.7 F

## 2020-01-21 PROCEDURE — 3331090002 HH PPS REVENUE DEBIT

## 2020-01-21 PROCEDURE — 3331090001 HH PPS REVENUE CREDIT

## 2020-01-21 PROCEDURE — G0157 HHC PT ASSISTANT EA 15: HCPCS

## 2020-01-22 PROCEDURE — 3331090001 HH PPS REVENUE CREDIT

## 2020-01-22 PROCEDURE — 3331090002 HH PPS REVENUE DEBIT

## 2020-01-23 ENCOUNTER — HOME CARE VISIT (OUTPATIENT)
Dept: SCHEDULING | Facility: HOME HEALTH | Age: 85
End: 2020-01-23
Payer: MEDICARE

## 2020-01-23 VITALS
HEART RATE: 98 BPM | TEMPERATURE: 98 F | OXYGEN SATURATION: 95 % | SYSTOLIC BLOOD PRESSURE: 118 MMHG | DIASTOLIC BLOOD PRESSURE: 64 MMHG

## 2020-01-23 PROCEDURE — G0157 HHC PT ASSISTANT EA 15: HCPCS

## 2020-01-23 PROCEDURE — 3331090002 HH PPS REVENUE DEBIT

## 2020-01-23 PROCEDURE — 3331090001 HH PPS REVENUE CREDIT

## 2020-01-24 PROCEDURE — 3331090002 HH PPS REVENUE DEBIT

## 2020-01-24 PROCEDURE — 3331090001 HH PPS REVENUE CREDIT

## 2020-01-25 PROCEDURE — 3331090002 HH PPS REVENUE DEBIT

## 2020-01-25 PROCEDURE — 3331090001 HH PPS REVENUE CREDIT

## 2020-01-26 PROCEDURE — 3331090001 HH PPS REVENUE CREDIT

## 2020-01-26 PROCEDURE — 3331090002 HH PPS REVENUE DEBIT

## 2020-01-27 ENCOUNTER — HOME CARE VISIT (OUTPATIENT)
Dept: HOME HEALTH SERVICES | Facility: HOME HEALTH | Age: 85
End: 2020-01-27
Payer: MEDICARE

## 2020-01-27 ENCOUNTER — HOME CARE VISIT (OUTPATIENT)
Dept: SCHEDULING | Facility: HOME HEALTH | Age: 85
End: 2020-01-27
Payer: MEDICARE

## 2020-01-27 VITALS
SYSTOLIC BLOOD PRESSURE: 128 MMHG | DIASTOLIC BLOOD PRESSURE: 62 MMHG | HEART RATE: 83 BPM | OXYGEN SATURATION: 95 % | TEMPERATURE: 97.8 F

## 2020-01-27 PROCEDURE — 3331090001 HH PPS REVENUE CREDIT

## 2020-01-27 PROCEDURE — G0157 HHC PT ASSISTANT EA 15: HCPCS

## 2020-01-27 PROCEDURE — 3331090002 HH PPS REVENUE DEBIT

## 2020-01-28 ENCOUNTER — HOME CARE VISIT (OUTPATIENT)
Dept: SCHEDULING | Facility: HOME HEALTH | Age: 85
End: 2020-01-28
Payer: MEDICARE

## 2020-01-28 PROCEDURE — 3331090001 HH PPS REVENUE CREDIT

## 2020-01-28 PROCEDURE — G0299 HHS/HOSPICE OF RN EA 15 MIN: HCPCS

## 2020-01-28 PROCEDURE — 3331090002 HH PPS REVENUE DEBIT

## 2020-01-29 ENCOUNTER — HOME CARE VISIT (OUTPATIENT)
Dept: SCHEDULING | Facility: HOME HEALTH | Age: 85
End: 2020-01-29
Payer: MEDICARE

## 2020-01-29 VITALS
HEART RATE: 74 BPM | TEMPERATURE: 97 F | RESPIRATION RATE: 16 BRPM | OXYGEN SATURATION: 98 % | SYSTOLIC BLOOD PRESSURE: 142 MMHG | DIASTOLIC BLOOD PRESSURE: 78 MMHG

## 2020-01-29 VITALS
SYSTOLIC BLOOD PRESSURE: 140 MMHG | OXYGEN SATURATION: 95 % | TEMPERATURE: 98 F | DIASTOLIC BLOOD PRESSURE: 70 MMHG | HEART RATE: 88 BPM

## 2020-01-29 PROCEDURE — 3331090001 HH PPS REVENUE CREDIT

## 2020-01-29 PROCEDURE — 3331090002 HH PPS REVENUE DEBIT

## 2020-01-29 PROCEDURE — G0157 HHC PT ASSISTANT EA 15: HCPCS

## 2020-01-30 PROCEDURE — 3331090002 HH PPS REVENUE DEBIT

## 2020-01-30 PROCEDURE — 3331090001 HH PPS REVENUE CREDIT

## 2020-01-31 PROCEDURE — 3331090002 HH PPS REVENUE DEBIT

## 2020-01-31 PROCEDURE — 3331090001 HH PPS REVENUE CREDIT

## 2020-02-01 PROCEDURE — 3331090001 HH PPS REVENUE CREDIT

## 2020-02-01 PROCEDURE — 3331090002 HH PPS REVENUE DEBIT

## 2020-02-02 PROCEDURE — 3331090001 HH PPS REVENUE CREDIT

## 2020-02-02 PROCEDURE — 3331090002 HH PPS REVENUE DEBIT

## 2020-02-03 ENCOUNTER — HOME CARE VISIT (OUTPATIENT)
Dept: HOME HEALTH SERVICES | Facility: HOME HEALTH | Age: 85
End: 2020-02-03
Payer: MEDICARE

## 2020-02-03 PROCEDURE — 3331090002 HH PPS REVENUE DEBIT

## 2020-02-03 PROCEDURE — 3331090001 HH PPS REVENUE CREDIT

## 2020-02-04 PROCEDURE — 3331090002 HH PPS REVENUE DEBIT

## 2020-02-04 PROCEDURE — 3331090001 HH PPS REVENUE CREDIT

## 2020-02-05 PROCEDURE — 3331090001 HH PPS REVENUE CREDIT

## 2020-02-05 PROCEDURE — 3331090002 HH PPS REVENUE DEBIT

## 2020-02-06 PROCEDURE — 3331090001 HH PPS REVENUE CREDIT

## 2020-02-06 PROCEDURE — 3331090002 HH PPS REVENUE DEBIT

## 2020-02-07 PROCEDURE — 3331090001 HH PPS REVENUE CREDIT

## 2020-02-07 PROCEDURE — 3331090002 HH PPS REVENUE DEBIT

## 2020-02-08 PROCEDURE — 3331090002 HH PPS REVENUE DEBIT

## 2020-02-08 PROCEDURE — 3331090001 HH PPS REVENUE CREDIT

## 2020-02-09 PROCEDURE — 3331090002 HH PPS REVENUE DEBIT

## 2020-02-09 PROCEDURE — 3331090001 HH PPS REVENUE CREDIT

## 2020-02-26 ENCOUNTER — HOME CARE VISIT (OUTPATIENT)
Dept: HOME HEALTH SERVICES | Facility: HOME HEALTH | Age: 85
End: 2020-02-26

## 2022-04-02 ASSESSMENT — VISUAL ACUITY
OS_SC: 20/30
OS_SC: 20/25
OS_SC: 20/30-1
OS_SC: 20/30
OD_SC: 20/50-2
OD_SC: 20/70
OS_CC: J1-2
OD_SC: 20/50-1
OS_SC: 20/30
OD_SC: 20/60
OD_CC: J1
OS_CC: J1
OD_SC: 20/60
OD_SC: 20/70
OS_SC: 20/30
OD_CC: J7

## 2022-04-02 ASSESSMENT — TONOMETRY
OD_IOP_MMHG: 15
OD_IOP_MMHG: 15
OS_IOP_MMHG: 15
OS_IOP_MMHG: 16
OD_IOP_MMHG: 16
OD_IOP_MMHG: 14
OS_IOP_MMHG: 15
OS_IOP_MMHG: 16
OD_IOP_MMHG: 16
OD_IOP_MMHG: 16
OS_IOP_MMHG: 16
OS_IOP_MMHG: 15

## 2022-11-02 NOTE — PATIENT DISCUSSION
SHILA w/ [facility] OU-The use/continuation of artificial tears were recommended. Spoke with pt and confirmed genetics appt. Also a staff message has been sent to '[facility] , per [facility]'s [facility].